# Patient Record
(demographics unavailable — no encounter records)

---

## 2025-05-16 NOTE — PHYSICAL EXAM
[Chaperoned Physical Exam] : A chaperone was present in the examining room during all aspects of the physical examination. [MA] : MA [FreeTextEntry2] : fern [Appropriately responsive] : appropriately responsive [Alert] : alert [No Acute Distress] : no acute distress [Soft] : soft [Non-tender] : non-tender [Non-distended] : non-distended [No Mass] : no mass [Oriented x3] : oriented x3 [Examination Of The Breasts] : a normal appearance [No Masses] : no breast masses were palpable [Labia Majora] : normal [Labia Minora] : normal [IUD String] : an IUD string was noted [Normal] : normal [Uterine Adnexae] : normal

## 2025-05-16 NOTE — PROCEDURE
[Cervical Pap Smear] : cervical Pap smear [Liquid Base] : liquid base [Locate IUD] : locate IUD [de-identified] : HPV [FreeTextEntry9] : Irregular menses

## 2025-05-16 NOTE — PLAN
[FreeTextEntry1] : 29-year-old with normal exam and late menses Pap HPV Office UCG negative patient given quant beta-hCG to do today as patient has IUD Self breast exam encouraged PHQ-9 slightly elevated but likely due to patient's anxiety over late.  Reviewed with patient total time 5 minutes Follow-up results of beta-hCG

## 2025-05-16 NOTE — HISTORY OF PRESENT ILLNESS
[FreeTextEntry1] : 29-year-old G2, P2 presents for routine exam with ParaGard IUD complaining of late menses last LMP 2025 patient says she is usually regular and is very anxious with all her PMS symptoms not feeling well.  States she feels a period coming it just did not come says she usually has spotting and cramping before the.  Since she had the ParaGard IUD in 10/23 and nothing has happened this month so far.  Office UCG is negative Past medical history none Past surgical history none  x 2 Allergies penicillin and vancomycin Medications none  normal Pap negative HPV Sexually active 1 partner